# Patient Record
Sex: MALE | Race: OTHER | HISPANIC OR LATINO | Employment: UNEMPLOYED | URBAN - METROPOLITAN AREA
[De-identification: names, ages, dates, MRNs, and addresses within clinical notes are randomized per-mention and may not be internally consistent; named-entity substitution may affect disease eponyms.]

---
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
## 2019-11-08 ENCOUNTER — DOCUMENTATION (OUTPATIENT)
Dept: AUDIOLOGY | Age: 19
End: 2019-11-08

## 2019-11-08 NOTE — PROGRESS NOTES
Progress Note    Name:  Rico Draper  :  2000  Age:  20 y o  Date of Evaluation: 19     Scanned documents         Rashmi Ramirez   Clinical Audiologist

## 2020-02-07 ENCOUNTER — HOSPITAL ENCOUNTER (EMERGENCY)
Facility: HOSPITAL | Age: 20
Discharge: HOME | End: 2020-02-07
Attending: EMERGENCY MEDICINE | Admitting: EMERGENCY MEDICINE
Payer: COMMERCIAL

## 2020-02-07 VITALS
SYSTOLIC BLOOD PRESSURE: 111 MMHG | OXYGEN SATURATION: 98 % | DIASTOLIC BLOOD PRESSURE: 58 MMHG | HEART RATE: 67 BPM | RESPIRATION RATE: 18 BRPM | TEMPERATURE: 98.5 F | WEIGHT: 124.34 LBS | BODY MASS INDEX: 19.47 KG/M2

## 2020-02-07 DIAGNOSIS — Z20.2 STD EXPOSURE: Primary | ICD-10-CM

## 2020-02-07 PROCEDURE — 99283 EMERGENCY DEPT VISIT LOW MDM: CPT

## 2020-02-07 PROCEDURE — 87491 CHLMYD TRACH DNA AMP PROBE: CPT | Performed by: PHYSICIAN ASSISTANT

## 2020-02-07 PROCEDURE — 96372 THER/PROPH/DIAG INJ SC/IM: CPT

## 2020-02-07 PROCEDURE — 99283 EMERGENCY DEPT VISIT LOW MDM: CPT | Performed by: PHYSICIAN ASSISTANT

## 2020-02-07 PROCEDURE — 87591 N.GONORRHOEAE DNA AMP PROB: CPT | Performed by: PHYSICIAN ASSISTANT

## 2020-02-07 RX ORDER — AZITHROMYCIN 250 MG/1
1000 TABLET, FILM COATED ORAL ONCE
Status: COMPLETED | OUTPATIENT
Start: 2020-02-07 | End: 2020-02-07

## 2020-02-07 RX ADMIN — AZITHROMYCIN 1000 MG: 250 TABLET, FILM COATED ORAL at 11:08

## 2020-02-07 RX ADMIN — LIDOCAINE HYDROCHLORIDE 250 MG: 10 INJECTION, SOLUTION EPIDURAL; INFILTRATION; INTRACAUDAL; PERINEURAL at 11:09

## 2020-02-07 NOTE — ED PROVIDER NOTES
History  Chief Complaint   Patient presents with    Exposure to STD     states recent sex with someone dx with pradeepa denies symptoms at this time     68-year-old male presents emerged part with concern for STD exposure  He states that he was recently notified by previous partner that they tested positive for an STD  He denies any symptoms at this time and states he feels well  No known allergies          Prior to Admission Medications   Prescriptions Last Dose Informant Patient Reported? Taking?   dicyclomine (BENTYL) 20 mg tablet   No No   Sig: Take 1 tablet (20 mg total) by mouth 2 (two) times a day   metoclopramide (REGLAN) 10 mg tablet   No No   Sig: Take 1 tablet (10 mg total) by mouth every 6 (six) hours      Facility-Administered Medications: None       Past Medical History:   Diagnosis Date    Depression     Heart murmur     Sensorineural deafness     bilateral    Suicide attempt ([facility] [address] )        History reviewed  No pertinent surgical history  Family History   Problem Relation Age of Onset    Asthma Mother     Heart attack Mother      I have reviewed and agree with the history as documented  Social History     Tobacco Use    Smoking status: Never Smoker    Smokeless tobacco: Never Used   Substance Use Topics    Alcohol use: No    Drug use: No        Review of Systems   All other systems reviewed and are negative  Physical Exam  Physical Exam   Constitutional: He is oriented to person, place, and time  He appears well-developed and well-nourished  He is cooperative  He does not appear ill  No distress  HENT:   Head: Normocephalic and atraumatic  Right Ear: External ear normal    Left Ear: External ear normal    Nose: Nose normal    Mouth/Throat: Oropharynx is clear and moist    Eyes: Pupils are equal, round, and reactive to light  EOM are normal    Neck: Normal range of motion  Neck supple     Cardiovascular: Normal rate, regular rhythm, normal heart sounds and intact distal pulses  Exam reveals no gallop and no friction rub  No murmur heard  Pulmonary/Chest: Effort normal and breath sounds normal  No stridor  No respiratory distress  He has no wheezes  He has no rales  Abdominal: Soft  Bowel sounds are normal  He exhibits no distension  There is no tenderness  There is no guarding  Musculoskeletal: Normal range of motion  He exhibits no tenderness  Neurological: He is alert and oriented to person, place, and time  Skin: Skin is warm  Capillary refill takes less than 2 seconds  Nursing note and vitals reviewed  Vital Signs  ED Triage Vitals [02/07/20 1032]   Temperature Pulse Respirations Blood Pressure SpO2   98 5 °F (36 9 °C) 67 18 111/58 98 %      Temp Source Heart Rate Source Patient Position - Orthostatic VS BP Location FiO2 (%)   Oral Monitor Sitting Right arm --      Pain Score       No Pain           Vitals:    02/07/20 1032   BP: 111/58   Pulse: 67   Patient Position - Orthostatic VS: Sitting         Visual Acuity      ED Medications  Medications   azithromycin (ZITHROMAX) tablet 1,000 mg (has no administration in time range)   cefTRIAXone (ROCEPHIN) 250 mg in lidocaine (PF) (XYLOCAINE-MPF) 1 % IM only syringe (has no administration in time range)       Diagnostic Studies  Results Reviewed     Procedure Component Value Units Date/Time    Chlamydia/GC amplified DNA by PCR [[id number]]     Lab Status:  No result                  No orders to display              Procedures  Procedures         ED Course                               MDM  Number of Diagnoses or Management Options  STD exposure: new and requires workup  Diagnosis management comments: Patient treated in the emergency department for gonorrhea chlamydia  Benign physical exam   Advised we will notify positive test results  Advised to remain abstinent of sexual intercourse for 2 weeks  Patient educated regarding their diagnosis and given return and follow-up instructions    Patient is understanding and in agreement with the treatment plan  There are no questions at the time of discharge  Amount and/or Complexity of Data Reviewed  Clinical lab tests: ordered    Risk of Complications, Morbidity, and/or Mortality  Presenting problems: low  Diagnostic procedures: low  Management options: low    Patient Progress  Patient progress: stable        Disposition  Final diagnoses:   STD exposure     Time reflects when diagnosis was documented in both MDM as applicable and the Disposition within this note     Time User Action Codes Description Comment    2/7/2020 11:06 AM Shelby Hayden Add [Z20 2] STD exposure       ED Disposition     ED Disposition Condition Date/Time Comment    Discharge Stable Fri Feb 7, 2020 11:06 AM Jaja Mishra discharge to home/self care  Follow-up Information     Follow up With Specialties Details Why [facility]   [address] [facility]  [phone]            Patient's Medications   Discharge Prescriptions    No medications on file     No discharge procedures on file      ED Provider  Electronically Signed by           Edwin López PA-C  02/07/20 5915

## 2020-02-08 LAB
C TRACH DNA SPEC QL NAA+PROBE: NEGATIVE
N GONORRHOEA DNA SPEC QL NAA+PROBE: NEGATIVE

## 2020-04-06 ENCOUNTER — HOSPITAL ENCOUNTER (EMERGENCY)
Facility: HOSPITAL | Age: 20
Discharge: HOME | End: 2020-04-06
Attending: EMERGENCY MEDICINE | Admitting: EMERGENCY MEDICINE
Payer: COMMERCIAL

## 2020-04-06 ENCOUNTER — APPOINTMENT (EMERGENCY)
Dept: RADIOLOGY | Facility: HOSPITAL | Age: 20
End: 2020-04-06
Payer: COMMERCIAL

## 2020-04-06 VITALS
WEIGHT: 119.71 LBS | TEMPERATURE: 98.2 F | OXYGEN SATURATION: 98 % | HEART RATE: 95 BPM | BODY MASS INDEX: 18.75 KG/M2 | DIASTOLIC BLOOD PRESSURE: 70 MMHG | RESPIRATION RATE: 16 BRPM | SYSTOLIC BLOOD PRESSURE: 122 MMHG

## 2020-04-06 DIAGNOSIS — S93.401A RIGHT ANKLE SPRAIN: Primary | ICD-10-CM

## 2020-04-06 PROCEDURE — 73630 X-RAY EXAM OF FOOT: CPT

## 2020-04-06 PROCEDURE — 73610 X-RAY EXAM OF ANKLE: CPT

## 2020-04-06 PROCEDURE — 99284 EMERGENCY DEPT VISIT MOD MDM: CPT | Performed by: PHYSICIAN ASSISTANT

## 2020-04-06 PROCEDURE — 99283 EMERGENCY DEPT VISIT LOW MDM: CPT

## 2023-12-17 ENCOUNTER — HOSPITAL ENCOUNTER (EMERGENCY)
Facility: HOSPITAL | Age: 23
Discharge: HOME | End: 2023-12-17
Attending: EMERGENCY MEDICINE
Payer: COMMERCIAL

## 2023-12-17 ENCOUNTER — APPOINTMENT (EMERGENCY)
Dept: CT IMAGING | Facility: HOSPITAL | Age: 23
End: 2023-12-17
Payer: COMMERCIAL

## 2023-12-17 VITALS
SYSTOLIC BLOOD PRESSURE: 122 MMHG | TEMPERATURE: 98.4 F | HEART RATE: 66 BPM | RESPIRATION RATE: 18 BRPM | WEIGHT: 153.44 LBS | DIASTOLIC BLOOD PRESSURE: 70 MMHG | BODY MASS INDEX: 24.03 KG/M2 | OXYGEN SATURATION: 99 %

## 2023-12-17 DIAGNOSIS — R22.0 LEFT FACIAL SWELLING: Primary | ICD-10-CM

## 2023-12-17 DIAGNOSIS — K04.7 DENTAL ABSCESS: ICD-10-CM

## 2023-12-17 DIAGNOSIS — L03.211 FACIAL CELLULITIS: ICD-10-CM

## 2023-12-17 LAB
ALBUMIN SERPL BCP-MCNC: 4.3 G/DL (ref 3.5–5)
ALP SERPL-CCNC: 95 U/L (ref 34–104)
ALT SERPL W P-5'-P-CCNC: 26 U/L (ref 7–52)
ANION GAP SERPL CALCULATED.3IONS-SCNC: 3 MMOL/L
APTT PPP: 28 SECONDS (ref 23–37)
AST SERPL W P-5'-P-CCNC: 32 U/L (ref 13–39)
BASOPHILS # BLD AUTO: 0.03 THOUSANDS/ÂΜL (ref 0–0.1)
BASOPHILS NFR BLD AUTO: 0 % (ref 0–1)
BILIRUB SERPL-MCNC: 0.35 MG/DL (ref 0.2–1)
BUN SERPL-MCNC: 10 MG/DL (ref 5–25)
CALCIUM SERPL-MCNC: 9.3 MG/DL (ref 8.4–10.2)
CHLORIDE SERPL-SCNC: 105 MMOL/L (ref 96–108)
CO2 SERPL-SCNC: 30 MMOL/L (ref 21–32)
CREAT SERPL-MCNC: 1.02 MG/DL (ref 0.6–1.3)
EOSINOPHIL # BLD AUTO: 0.09 THOUSAND/ÂΜL (ref 0–0.61)
EOSINOPHIL NFR BLD AUTO: 1 % (ref 0–6)
ERYTHROCYTE [DISTWIDTH] IN BLOOD BY AUTOMATED COUNT: 14.4 % (ref 11.6–15.1)
GFR SERPL CREATININE-BSD FRML MDRD: 102 ML/MIN/1.73SQ M
GLUCOSE SERPL-MCNC: 96 MG/DL (ref 65–140)
HCT VFR BLD AUTO: 48 % (ref 36.5–49.3)
HGB BLD-MCNC: 15.6 G/DL (ref 12–17)
IMM GRANULOCYTES # BLD AUTO: 0.05 THOUSAND/UL (ref 0–0.2)
IMM GRANULOCYTES NFR BLD AUTO: 0 % (ref 0–2)
INR PPP: 0.95 (ref 0.84–1.19)
LYMPHOCYTES # BLD AUTO: 1.44 THOUSANDS/ÂΜL (ref 0.6–4.47)
LYMPHOCYTES NFR BLD AUTO: 11 % (ref 14–44)
MAGNESIUM SERPL-MCNC: 1.9 MG/DL (ref 1.9–2.7)
MCH RBC QN AUTO: 27.7 PG (ref 26.8–34.3)
MCHC RBC AUTO-ENTMCNC: 32.5 G/DL (ref 31.4–37.4)
MCV RBC AUTO: 85 FL (ref 82–98)
MONOCYTES # BLD AUTO: 1.04 THOUSAND/ÂΜL (ref 0.17–1.22)
MONOCYTES NFR BLD AUTO: 8 % (ref 4–12)
NEUTROPHILS # BLD AUTO: 10.19 THOUSANDS/ÂΜL (ref 1.85–7.62)
NEUTS SEG NFR BLD AUTO: 80 % (ref 43–75)
NRBC BLD AUTO-RTO: 0 /100 WBCS
PLATELET # BLD AUTO: 265 THOUSANDS/UL (ref 149–390)
PMV BLD AUTO: 9.8 FL (ref 8.9–12.7)
POTASSIUM SERPL-SCNC: 3.9 MMOL/L (ref 3.5–5.3)
PROT SERPL-MCNC: 6.8 G/DL (ref 6.4–8.4)
PROTHROMBIN TIME: 12.9 SECONDS (ref 11.6–14.5)
RBC # BLD AUTO: 5.64 MILLION/UL (ref 3.88–5.62)
SODIUM SERPL-SCNC: 138 MMOL/L (ref 135–147)
WBC # BLD AUTO: 12.84 THOUSAND/UL (ref 4.31–10.16)

## 2023-12-17 PROCEDURE — 85025 COMPLETE CBC W/AUTO DIFF WBC: CPT | Performed by: EMERGENCY MEDICINE

## 2023-12-17 PROCEDURE — 96375 TX/PRO/DX INJ NEW DRUG ADDON: CPT

## 2023-12-17 PROCEDURE — 85610 PROTHROMBIN TIME: CPT | Performed by: EMERGENCY MEDICINE

## 2023-12-17 PROCEDURE — 96361 HYDRATE IV INFUSION ADD-ON: CPT

## 2023-12-17 PROCEDURE — G1004 CDSM NDSC: HCPCS

## 2023-12-17 PROCEDURE — 99284 EMERGENCY DEPT VISIT MOD MDM: CPT

## 2023-12-17 PROCEDURE — 83735 ASSAY OF MAGNESIUM: CPT | Performed by: EMERGENCY MEDICINE

## 2023-12-17 PROCEDURE — 80053 COMPREHEN METABOLIC PANEL: CPT | Performed by: EMERGENCY MEDICINE

## 2023-12-17 PROCEDURE — 85730 THROMBOPLASTIN TIME PARTIAL: CPT | Performed by: EMERGENCY MEDICINE

## 2023-12-17 PROCEDURE — 96365 THER/PROPH/DIAG IV INF INIT: CPT

## 2023-12-17 PROCEDURE — 99285 EMERGENCY DEPT VISIT HI MDM: CPT | Performed by: EMERGENCY MEDICINE

## 2023-12-17 PROCEDURE — 70487 CT MAXILLOFACIAL W/DYE: CPT

## 2023-12-17 PROCEDURE — 36415 COLL VENOUS BLD VENIPUNCTURE: CPT | Performed by: EMERGENCY MEDICINE

## 2023-12-17 RX ORDER — CEPHALEXIN 500 MG/1
500 CAPSULE ORAL EVERY 6 HOURS SCHEDULED
Qty: 28 CAPSULE | Refills: 0 | Status: SHIPPED | OUTPATIENT
Start: 2023-12-17 | End: 2023-12-24

## 2023-12-17 RX ORDER — CEFAZOLIN SODIUM 2 G/50ML
2000 SOLUTION INTRAVENOUS ONCE
Status: COMPLETED | OUTPATIENT
Start: 2023-12-17 | End: 2023-12-17

## 2023-12-17 RX ORDER — CHLORHEXIDINE GLUCONATE ORAL RINSE 1.2 MG/ML
15 SOLUTION DENTAL 2 TIMES DAILY
Qty: 120 ML | Refills: 0 | Status: SHIPPED | OUTPATIENT
Start: 2023-12-17

## 2023-12-17 RX ORDER — TRAMADOL HYDROCHLORIDE 50 MG/1
25 TABLET ORAL EVERY 8 HOURS PRN
Qty: 3 TABLET | Refills: 0 | Status: SHIPPED | OUTPATIENT
Start: 2023-12-17 | End: 2023-12-19

## 2023-12-17 RX ORDER — KETOROLAC TROMETHAMINE 30 MG/ML
30 INJECTION, SOLUTION INTRAMUSCULAR; INTRAVENOUS ONCE
Status: COMPLETED | OUTPATIENT
Start: 2023-12-17 | End: 2023-12-17

## 2023-12-17 RX ORDER — DEXAMETHASONE SODIUM PHOSPHATE 10 MG/ML
10 INJECTION, SOLUTION INTRAMUSCULAR; INTRAVENOUS ONCE
Status: COMPLETED | OUTPATIENT
Start: 2023-12-17 | End: 2023-12-17

## 2023-12-17 RX ORDER — PREDNISONE 20 MG/1
40 TABLET ORAL DAILY
Qty: 6 TABLET | Refills: 0 | Status: SHIPPED | OUTPATIENT
Start: 2023-12-17 | End: 2023-12-20

## 2023-12-17 RX ADMIN — CEFAZOLIN SODIUM 2000 MG: 2 SOLUTION INTRAVENOUS at 15:14

## 2023-12-17 RX ADMIN — DEXAMETHASONE SODIUM PHOSPHATE 10 MG: 10 INJECTION INTRAMUSCULAR; INTRAVENOUS at 15:14

## 2023-12-17 RX ADMIN — KETOROLAC TROMETHAMINE 30 MG: 30 INJECTION, SOLUTION INTRAMUSCULAR; INTRAVENOUS at 15:14

## 2023-12-17 RX ADMIN — IOHEXOL 85 ML: 350 INJECTION, SOLUTION INTRAVENOUS at 16:45

## 2023-12-17 RX ADMIN — SODIUM CHLORIDE 1000 ML: 0.9 INJECTION, SOLUTION INTRAVENOUS at 15:14

## 2023-12-17 NOTE — Clinical Note
Deion Wilson was seen and treated in our emergency department on 12/17/2023.                Diagnosis:     Deion  may return to work on return date.    He may return on this date: 12/19/2023         If you have any questions or concerns, please don't hesitate to call.      Doreen Galeas, DO    ______________________________           _______________          _______________  Hospital Representative                              Date                                Time

## 2023-12-17 NOTE — DISCHARGE INSTRUCTIONS
Please alternate Tylenol and Motrin every 6-8 hours for fever and pain control  Please complete full course of antibiotics  PLEASE CALL YOUR DENTIST TOMORROW FOR CLOSE FOLLOW UP. IF YOU CANNOT CONTACT THEM, PLEASE CALL THE DENTAL CLINIC LISTED ABOVE

## 2023-12-17 NOTE — ED PROVIDER NOTES
History  Chief Complaint   Patient presents with    Facial Swelling     Pt c/o left sided facial swelling due to poor dental pratices. Pt states he has some infected wisdom teeth. Pt states the swelling stated x 3 days ago but has worsened within the past x 1 day. Pt denies any breathing/swallowing issues.      Patient is a 23-year-old male otherwise healthy coming in today for facial swelling on the left side.  He states initially 3 days ago it started with minimal swelling but progressively worsened.  He has no fevers, chills, nausea, vomiting or fevers.  No ear pain, sore throat, or trauma.  He states that he was taken ibuprofen without relief.  He took a dose of his dad's girlfriend antibiotic but unsure what kind.  He states that the swelling is causing just more pressure. Patient denies difficulty eating, drinking, or swallowing. No shortness or breath.       History provided by:  Patient and medical records   used: No    Dental Pain  Location:  Upper  Quality:  Aching, constant and dull  Severity:  Moderate  Onset quality:  Gradual  Duration:  3 days  Timing:  Constant  Progression:  Worsening  Chronicity:  New  Relieved by:  Nothing  Worsened by:  Nothing  Ineffective treatments:  Acetaminophen and NSAIDs  Associated symptoms: facial pain and facial swelling    Associated symptoms: no congestion, no difficulty swallowing, no drooling, no fever, no gum swelling, no headaches, no neck pain, no neck swelling, no oral bleeding, no oral lesions and no trismus    Risk factors: no alcohol problem, no cancer, no chewing tobacco use, no diabetes, no immunosuppression, sufficient dental care, no periodontal disease and no smoking        Prior to Admission Medications   Prescriptions Last Dose Informant Patient Reported? Taking?   dicyclomine (BENTYL) 20 mg tablet Not Taking  No No   Sig: Take 1 tablet (20 mg total) by mouth 2 (two) times a day   Patient not taking: Reported on 12/17/2023    metoclopramide (REGLAN) 10 mg tablet Not Taking  No No   Sig: Take 1 tablet (10 mg total) by mouth every 6 (six) hours   Patient not taking: Reported on 12/17/2023      Facility-Administered Medications: None       Past Medical History:   Diagnosis Date    Depression     Heart murmur     Sensorineural deafness     bilateral    Suicide attempt (HCC)        History reviewed. No pertinent surgical history.    Family History   Problem Relation Age of Onset    Asthma Mother     Heart attack Mother      I have reviewed and agree with the history as documented.    E-Cigarette/Vaping    E-Cigarette Use Never User      E-Cigarette/Vaping Substances     Social History     Tobacco Use    Smoking status: Some Days     Types: Cigarettes    Smokeless tobacco: Never   Vaping Use    Vaping status: Never Used   Substance Use Topics    Alcohol use: No    Drug use: No       Review of Systems   Constitutional: Negative.  Negative for chills and fever.   HENT:  Positive for facial swelling. Negative for congestion, drooling, ear pain, mouth sores and sore throat.    Eyes: Negative.  Negative for pain and visual disturbance.   Respiratory: Negative.  Negative for cough and shortness of breath.    Cardiovascular: Negative.  Negative for chest pain and palpitations.   Gastrointestinal: Negative.  Negative for abdominal pain and vomiting.   Genitourinary: Negative.  Negative for dysuria and hematuria.   Musculoskeletal: Negative.  Negative for arthralgias, back pain and neck pain.   Skin: Negative.  Negative for color change and rash.   Neurological: Negative.  Negative for seizures, syncope and headaches.   Hematological: Negative.    Psychiatric/Behavioral: Negative.     All other systems reviewed and are negative.      Physical Exam  Physical Exam  Vitals and nursing note reviewed.   Constitutional:       General: He is not in acute distress.     Appearance: He is well-developed.   HENT:      Head: Normocephalic and atraumatic.         Right Ear: Tympanic membrane, ear canal and external ear normal.      Left Ear: Tympanic membrane, ear canal and external ear normal.      Nose: Nose normal.      Mouth/Throat:      Lips: Pink.      Mouth: Mucous membranes are moist.      Pharynx: Uvula midline.      Comments: Patient maintaining airway and secretions. No stridor . No brawniness under tongue.  Posterior pharynx clear without any exudate.  Uvula midline without edema    Noted along the buccal left upper molar region, moderate swelling with no obvious abscess.  There is no obvious periodontal abscess.  Eyes:      Conjunctiva/sclera: Conjunctivae normal.   Cardiovascular:      Rate and Rhythm: Normal rate and regular rhythm.      Pulses:           Radial pulses are 2+ on the right side and 2+ on the left side.      Heart sounds: Normal heart sounds, S1 normal and S2 normal. No murmur heard.  Pulmonary:      Effort: Pulmonary effort is normal. No respiratory distress.      Breath sounds: Normal breath sounds.   Abdominal:      Palpations: Abdomen is soft.      Tenderness: There is no abdominal tenderness.   Musculoskeletal:         General: No swelling.      Cervical back: Neck supple.      Right lower leg: No edema.      Left lower leg: No edema.   Skin:     General: Skin is warm and dry.      Capillary Refill: Capillary refill takes less than 2 seconds.   Neurological:      General: No focal deficit present.      Mental Status: He is alert and oriented to person, place, and time.      GCS: GCS eye subscore is 4. GCS verbal subscore is 5. GCS motor subscore is 6.      Cranial Nerves: Cranial nerves 2-12 are intact.      Sensory: Sensation is intact.      Motor: Motor function is intact.      Coordination: Coordination is intact.      Comments: No slurred speech.  No facial asymmetry.  No tongue deviation.   Psychiatric:         Mood and Affect: Mood normal.         Behavior: Behavior normal.         Thought Content: Thought content normal.          Judgment: Judgment normal.         Vital Signs  ED Triage Vitals [12/17/23 1437]   Temperature Pulse Respirations Blood Pressure SpO2   98.4 °F (36.9 °C) 85 18 153/73 98 %      Temp Source Heart Rate Source Patient Position - Orthostatic VS BP Location FiO2 (%)   Oral Monitor Sitting Right arm --      Pain Score       6           Vitals:    12/17/23 1437 12/17/23 1746   BP: 153/73 122/70   Pulse: 85 66   Patient Position - Orthostatic VS: Sitting Lying         Visual Acuity      ED Medications  Medications   sodium chloride 0.9 % bolus 1,000 mL (0 mL Intravenous Stopped 12/17/23 1658)   dexamethasone (PF) (DECADRON) injection 10 mg (10 mg Intravenous Given 12/17/23 1514)   ketorolac (TORADOL) injection 30 mg (30 mg Intravenous Given 12/17/23 1514)   ceFAZolin (ANCEF) IVPB (premix in dextrose) 2,000 mg 50 mL (0 mg Intravenous Stopped 12/17/23 1553)   iohexol (OMNIPAQUE) 350 MG/ML injection (MULTI-DOSE) 85 mL (85 mL Intravenous Given 12/17/23 1645)       Diagnostic Studies  Results Reviewed       Procedure Component Value Units Date/Time    Comprehensive metabolic panel [[id number]] Collected: 12/17/23 1513    Lab Status: Final result Specimen: Blood from Arm, Right Updated: 12/17/23 1543     Sodium 138 mmol/L      Potassium 3.9 mmol/L      Chloride 105 mmol/L      CO2 30 mmol/L      ANION GAP 3 mmol/L      BUN 10 mg/dL      Creatinine 1.02 mg/dL      Glucose 96 mg/dL      Calcium 9.3 mg/dL      AST 32 U/L      ALT 26 U/L      Alkaline Phosphatase 95 U/L      Total Protein 6.8 g/dL      Albumin 4.3 g/dL      Total Bilirubin 0.35 mg/dL      eGFR 102 ml/min/1.73sq m     Narrative:      National Kid[employer] Disease Foundation guidelines for Chronic Kidney Disease (CKD):     Stage 1 with normal or high GFR (GFR > 90 mL/min/1.73 square meters)    Stage 2 Mild CKD (GFR = 60-89 mL/min/1.73 square meters)    Stage 3A Moderate CKD (GFR = 45-59 mL/min/1.73 square meters)    Stage 3B Moderate CKD (GFR = 30-44 mL/min/1.73 square  meters)    Stage 4 Severe CKD (GFR = 15-29 mL/min/1.73 square meters)    Stage 5 End Stage CKD (GFR <15 mL/min/1.73 square meters)  Note: GFR calculation is accurate only with a steady state creatinine    Magnesium [[id number]]  (Normal) Collected: 12/17/23 1513    Lab Status: Final result Specimen: Blood from Arm, Right Updated: 12/17/23 1543     Magnesium 1.9 mg/dL     Protime-INR [[id number]]  (Normal) Collected: 12/17/23 1513    Lab Status: Final result Specimen: Blood from Arm, Right Updated: 12/17/23 1534     Protime 12.9 seconds      INR 0.95    APTT [[id number]]  (Normal) Collected: 12/17/23 1513    Lab Status: Final result Specimen: Blood from Arm, Right Updated: 12/17/23 1534     PTT 28 seconds     CBC and differential [[id number]]  (Abnormal) Collected: 12/17/23 1513    Lab Status: Final result Specimen: Blood from Arm, Right Updated: 12/17/23 1524     WBC 12.84 Thousand/uL      RBC 5.64 Million/uL      Hemoglobin 15.6 g/dL      Hematocrit 48.0 %      MCV 85 fL      MCH 27.7 pg      MCHC 32.5 g/dL      RDW 14.4 %      MPV 9.8 fL      Platelets 265 Thousands/uL      nRBC 0 /100 WBCs      Neutrophils Relative 80 %      Immat GRANS % 0 %      Lymphocytes Relative 11 %      Monocytes Relative 8 %      Eosinophils Relative 1 %      Basophils Relative 0 %      Neutrophils Absolute 10.19 Thousands/µL      Immature Grans Absolute 0.05 Thousand/uL      Lymphocytes Absolute 1.44 Thousands/µL      Monocytes Absolute 1.04 Thousand/µL      Eosinophils Absolute 0.09 Thousand/µL      Basophils Absolute 0.03 Thousands/µL                    CT facial bones with contrast   Final Result by Francesco Mccarty MD (12/17 1727)      Left second maxillary molar periapical abscess with a regional collection adjacent to the left maxilla and associated cellulitis. Polypoid mucosal thickening of the left maxillary sinus is likely odontogenic in origin.         Workstation performed: [id number]                    Procedures  Procedures         ED  "Course  ED Course as of 12/17/23 1811   Sun Dec 17, 2023   1503 Patient is a 24-year-old male coming in today with nontraumatic left-sided facial swelling.  On exam he does have warmness to touch along the left side of cheek with no obvious injury or    Disclosure: Voice to text software was used in the preparation of this document and could have resulted in translational errors.      Occasional wrong word or \"sound a like\" substitutions may have occurred due to the inherent limitations of voice recognition software.  Read the chart carefully and recognize, using context, where substitutions have occurred.       I have independently reviewed external records are available to me to the level of detail possible within the time constraints of my patient care responsibilities in the ED.       1545 Labs reviewed and without actionable derangement     1550 Patient resting in bed.  Patient states that he does feel the swelling is decreased and an exam there is mild improvement in swelling.  He is maintaining airway and secretions and no respiratory distress.  Speaking full sentences.  Updated on labs and pending CT   1738 Patient resting in bed.  Marked improvement of left-sided facial swelling.  I again do not appreciate any periapical abscess on my exam.  He is maintaining airway and secretions.  There is no brawniness under the tongue and no tongue deviation.  No slurred speech.    Long discussion with patient regarding CT findings.  Will DC home with antibiotics, short course of steroids as well as pain control and anti-inflammatories.  He states he does have a dentist.  I instructed him is important and imperative to follow-up with a dentist in the next few days.  Return to ER instructions given    Counseling: I had a detailed discussion with the patient and/or guardian regarding: the historical points, exam findings, and any diagnostic results supporting the discharge diagnosis, lab results, radiology results, " discharge instructions reviewed with patient and/or family/caregiver and understanding was verbalized. Instructions given to return to the emergency department if symptoms worsen or persist, or if there are any questions or concerns that arise at home.     All imaging and/or lab testing discussed with patient, strict return to ED precautions discussed. Patient recommended to follow up promptly with appropriate outpatient provider. Patient and/or family members verbalizes understanding and agrees with plan. Patient and/or family members were given opportunity to ask questions, all questions were answered at this time. Patient is stable for discharge                                   SBIRT 20yo+      Flowsheet Row Most Recent Value   Initial Alcohol Screen: US AUDIT-C     1. How often do you have a drink containing alcohol? 0 Filed at: 12/17/2023 1524   2. How many drinks containing alcohol do you have on a typical day you are drinking?  0 Filed at: 12/17/2023 1524   3a. Male UNDER 65: How often do you have five or more drinks on one occasion? 0 Filed at: 12/17/2023 1524   Audit-C Score 0 Filed at: 12/17/2023 1524   XANDER: How many times in the past year have you...    Used an illegal drug or used a prescription medication for non-medical reasons? Never Filed at: 12/17/2023 1524                      Medical Decision Making  differential diagnosis includes but not limited to: Dental caries, pulpitis, fracture, periapical abscess, periodontitis, abscess, tooth grinding, neuritis, sinus infection, neuropathic pain, gingivitis, ludwigs angina      Amount and/or Complexity of Data Reviewed  External Data Reviewed: notes.  Labs: ordered. Decision-making details documented in ED Course.     Details: Mild leukocytosis without bands.  No acute kidney injury or electrolyte dysfunction  No anemia or thrombocytopenia  Radiology: ordered. Decision-making details documented in ED Course.     Details: CT interpreted by radiologist  as    Risk  Prescription drug management.             Disposition  Final diagnoses:   Left facial swelling   Facial cellulitis   Dental abscess     Time reflects when diagnosis was documented in both MDM as applicable and the Disposition within this note       Time User Action Codes Description Comment    12/17/2023  3:14 PM Doreen Galeas Add [R22.0] Left facial swelling     12/17/2023  5:40 PM Doreen Galeas Add [L03.211] Facial cellulitis     12/17/2023  5:40 PM Doreen Galeas Add [K04.7] Dental abscess           ED Disposition       ED Disposition   Discharge    Condition   Stable    Date/Time   Sun Dec 17, 2023 1749    Comment   Deion Wilson discharge to home/self care.                   Follow-up Information       Follow up With Specialties Details Why Contact Info Additional Information    [facility] Schedule an appointment as soon as possible for a visit in 1 week  [address] [place] [id number] [phone] [facility], [address] [place] [address], [address] [phone]    [facility]  Call in 2 days  [address] [facility] [address] [phone]             Discharge Medication List as of 12/17/2023  5:49 PM        START taking these medications    Details   cephalexin (KEFLEX) 500 mg capsule Take 1 capsule (500 mg total) by mouth every 6 (six) hours for 7 days, Starting Sun 12/17/2023, Until Sun 12/24/2023, Normal      chlorhexidine (PERIDEX) 0.12 % solution Apply 15 mL to the mouth or throat 2 (two) times a day, Starting Sun 12/17/2023, Normal      predniSONE 20 mg tablet Take 2 tablets (40 mg total) by mouth daily for 3 days, Starting Sun 12/17/2023, Until Wed 12/20/2023, Normal      traMADol (Ultram) 50 mg tablet Take 0.5 tablets (25 mg total) by mouth every 8 (eight) hours as needed for moderate pain for up to 2  days, Starting Sun 12/17/2023, Until Tue 12/19/2023 at 2359, Normal           CONTINUE these medications which have NOT CHANGED    Details   dicyclomine (BENTYL) 20 mg tablet Take 1 tablet (20 mg total) by mouth 2 (two) times a day, Starting Sat 9/8/2018, Print      metoclopramide (REGLAN) 10 mg tablet Take 1 tablet (10 mg total) by mouth every 6 (six) hours, Starting Sat 9/8/2018, Print             No discharge procedures on file.    PDMP Review       None            ED Provider  Electronically Signed by             Doreen Galeas DO  12/17/23 5852

## 2024-01-06 ENCOUNTER — HOSPITAL ENCOUNTER (EMERGENCY)
Facility: HOSPITAL | Age: 24
Discharge: HOME | End: 2024-01-06
Attending: EMERGENCY MEDICINE
Payer: COMMERCIAL

## 2024-01-06 ENCOUNTER — APPOINTMENT (EMERGENCY)
Dept: RADIOLOGY | Facility: HOSPITAL | Age: 24
End: 2024-01-06
Payer: COMMERCIAL

## 2024-01-06 VITALS
HEART RATE: 107 BPM | SYSTOLIC BLOOD PRESSURE: 136 MMHG | OXYGEN SATURATION: 97 % | RESPIRATION RATE: 16 BRPM | DIASTOLIC BLOOD PRESSURE: 99 MMHG

## 2024-01-06 DIAGNOSIS — S61.219A FINGER LACERATION: Primary | ICD-10-CM

## 2024-01-06 PROCEDURE — 99284 EMERGENCY DEPT VISIT MOD MDM: CPT

## 2024-01-06 PROCEDURE — 12002 RPR S/N/AX/GEN/TRNK2.6-7.5CM: CPT

## 2024-01-06 PROCEDURE — 99283 EMERGENCY DEPT VISIT LOW MDM: CPT

## 2024-01-06 RX ORDER — CEPHALEXIN 500 MG/1
500 CAPSULE ORAL EVERY 8 HOURS SCHEDULED
Qty: 15 CAPSULE | Refills: 0 | Status: SHIPPED | OUTPATIENT
Start: 2024-01-06 | End: 2024-01-11

## 2024-01-06 RX ORDER — CEPHALEXIN 250 MG/1
500 CAPSULE ORAL ONCE
Status: COMPLETED | OUTPATIENT
Start: 2024-01-06 | End: 2024-01-06

## 2024-01-06 RX ORDER — LIDOCAINE HYDROCHLORIDE 10 MG/ML
10 INJECTION, SOLUTION EPIDURAL; INFILTRATION; INTRACAUDAL; PERINEURAL ONCE
Status: COMPLETED | OUTPATIENT
Start: 2024-01-06 | End: 2024-01-06

## 2024-01-06 RX ADMIN — CEPHALEXIN 500 MG: 250 CAPSULE ORAL at 20:21

## 2024-01-06 RX ADMIN — LIDOCAINE HYDROCHLORIDE 10 ML: 10 INJECTION, SOLUTION EPIDURAL; INFILTRATION; INTRACAUDAL at 20:01

## 2024-01-07 NOTE — ED PROVIDER NOTES
"History  Chief Complaint   Patient presents with    Hand Laceration     Pt reports cut self with knife on L hand. Unknown tetanus vaccine. No thinners. Unsure how deep cut is     23-year-old male presenting to the ED with complaints of laceration over the left hand.  Triage note states that patient cut himself with a knife.  Initially patient said that he did cut himself with a knife.  When trying to question further patient would not elaborate.  Then he states he punched a wall.  This provider asked the patient if he punched a wall with a knife.  Patient proceeded to stay \"I am just lying to, I just want my laceration repaired so I can go home.\"  Patient denies that he cut himself intentionally.  He denies SI and HI.  He denies any other injuries.  Denies that whatever cut his finger was overtly dirty or contaminated.  Uncertain of last tetanus but patient reports that he does not want his tetanus updated today.  He declines any pain medications.  No other complaints today.          Prior to Admission Medications   Prescriptions Last Dose Informant Patient Reported? Taking?   chlorhexidine (PERIDEX) 0.12 % solution   No No   Sig: Apply 15 mL to the mouth or throat 2 (two) times a day   dicyclomine (BENTYL) 20 mg tablet   No No   Sig: Take 1 tablet (20 mg total) by mouth 2 (two) times a day   Patient not taking: Reported on 12/17/2023   metoclopramide (REGLAN) 10 mg tablet   No No   Sig: Take 1 tablet (10 mg total) by mouth every 6 (six) hours   Patient not taking: Reported on 12/17/2023      Facility-Administered Medications: None       Past Medical History:   Diagnosis Date    Depression     Heart murmur     Sensorineural deafness     bilateral    Suicide attempt (HCC)        History reviewed. No pertinent surgical history.    Family History   Problem Relation Age of Onset    Asthma Mother     Heart attack Mother      I have reviewed and agree with the history as documented.    E-Cigarette/Vaping    E-Cigarette " Use Never User      E-Cigarette/Vaping Substances     Social History     Tobacco Use    Smoking status: Some Days     Types: Cigarettes    Smokeless tobacco: Never   Vaping Use    Vaping status: Never Used   Substance Use Topics    Alcohol use: No    Drug use: No       Review of Systems   Skin:  Positive for wound (Left hand).       Physical Exam  Physical Exam  Vitals and nursing note reviewed.   Constitutional:       General: He is not in acute distress.     Appearance: He is well-developed.      Comments: Awake, alert, interactive and resting in the stretcher in no acute distress.  Patient is not ill or toxic appearing.     HENT:      Head: Normocephalic and atraumatic.   Eyes:      Conjunctiva/sclera: Conjunctivae normal.   Cardiovascular:      Rate and Rhythm: Normal rate and regular rhythm.      Heart sounds: No murmur heard.  Pulmonary:      Effort: Pulmonary effort is normal. No respiratory distress.      Breath sounds: Normal breath sounds.   Abdominal:      Palpations: Abdomen is soft.      Tenderness: There is no abdominal tenderness.   Musculoskeletal:         General: No swelling.      Cervical back: Neck supple.      Comments: Laceration over the dorsal aspect of the base of the left middle finger.  There is a flap of skin.  There is a minor venous ooze.  There is no exposed bone, tendon, ligaments or vasculature.  There is no associated bony tenderness.  Patient able to flex and extend all joints of all digits of the left hand.  Distal cap refill over the left middle finger is <2 secs.  Few minor abrasions over the left hand otherwise no other signs of injury or trauma.  PE otherwise unremarkable.  No other signs of injury or trauma on exam.   Skin:     General: Skin is warm and dry.      Capillary Refill: Capillary refill takes less than 2 seconds.   Neurological:      Mental Status: He is alert and oriented to person, place, and time.      GCS: GCS eye subscore is 4. GCS verbal subscore is 5. GCS  motor subscore is 6.   Psychiatric:         Mood and Affect: Mood normal.               Vital Signs  ED Triage Vitals [01/06/24 1853]   Temp Pulse Respirations Blood Pressure SpO2   -- (!) 107 16 136/99 97 %      Temp src Heart Rate Source Patient Position - Orthostatic VS BP Location FiO2 (%)   -- Monitor Sitting Right arm --      Pain Score       --           Vitals:    01/06/24 1853   BP: 136/99   Pulse: (!) 107   Patient Position - Orthostatic VS: Sitting         Visual Acuity      ED Medications  Medications   lidocaine (PF) (XYLOCAINE-MPF) 1 % injection 10 mL (10 mL Infiltration Given 1/6/24 2001)   cephalexin (KEFLEX) capsule 500 mg (500 mg Oral Given 1/6/24 2021)       Diagnostic Studies  Results Reviewed       None                   XR hand 3+ views LEFT    (Results Pending)              Procedures  Universal Protocol:  Consent: Verbal consent obtained.  Risks and benefits: risks, benefits and alternatives were discussed  Consent given by: patient  Patient understanding: patient states understanding of the procedure being performed  Laceration repair    Date/Time: 1/6/2024 8:32 PM    Performed by: Ady Shin PA-C  Authorized by: Ady Shin PA-C  Body area: upper extremity  Location details: left long finger  Laceration length: 4 cm  Foreign bodies: no foreign bodies  Tendon involvement: none  Nerve involvement: none  Vascular damage: no  Anesthesia: local infiltration    Anesthesia:  Local Anesthetic: lidocaine 1% without epinephrine  Anesthetic total: 2 mL    Sedation:  Patient sedated: no        Procedure Details:  Preparation: Patient was prepped and draped in the usual sterile fashion.  Irrigation solution: saline  Irrigation method: syringe  Amount of cleaning: standard  Debridement: none  Skin closure: 4-0 nylon  Number of sutures: 6  Technique: simple  Approximation: close  Approximation difficulty: simple  Patient tolerance: patient tolerated the procedure well with no  "immediate complications  Comments: Hemostasis achieved.  Covered with Xeroform and Band-Aid.               ED Course  ED Course as of 01/06/24 2042   Sat Jan 06, 2024 2035 Laceration repaired without issue.  Patient declined XR to rule out osseous abnormality, lower suspicion for radiopaque retained foreign body as base of wound irrigated with no foreign bodies appreciated.  At time of discharge patient states he feels well to go home.  He feels safe to go home.  He does not need police called.  He continues to deny SI and HI.  Patient A&O x 3.  GCS-15.  Clinically sober with no signs of intoxication.  Will start patient on Keflex in light of uncertainty how patient got the wound for infection prevention.  Advised patient to have the sutures removed in 10-14 days.  Patient wants to be discharged he does not want a wait for his discharge paperwork.  Antibiotic sent to patient's pharmacy.  Strict return precautions verbally communicated to the patient as outlined in the AVS.  All patient questions and concerns were answered.  Patient verbally communicated their understanding and agreement to the above plan.  Patient stable at discharge.       Portions of the record may have been created with voice recognition software. Occasional wrong word or \"sound a like\" substitutions may have occurred due to the inherent limitations of voice recognition software. Read the chart carefully and recognize, using context, where substitutions have occurred.     2041 Per chart review. Tdap- 11/27/17. Pt continues to decline having this updated.                                SBIRT 20yo+      Flowsheet Row Most Recent Value   Initial Alcohol Screen: US AUDIT-C     1. How often do you have a drink containing alcohol? 0 Filed at: 01/06/2024 2009   2. How many drinks containing alcohol do you have on a typical day you are drinking?  0 Filed at: 01/06/2024 2009   3a. Male UNDER 65: How often do you have five or more drinks on one occasion? " 0 Filed at: 01/06/2024 2009   3b. FEMALE Any Age, or MALE 65+: How often do you have 4 or more drinks on one occassion? 0 Filed at: 01/06/2024 2009   Audit-C Score 0 Filed at: 01/06/2024 2009   XANDER: How many times in the past year have you...    Used an illegal drug or used a prescription medication for non-medical reasons? Never Filed at: 01/06/2024 2009                      Medical Decision Making  Amount and/or Complexity of Data Reviewed  Radiology: ordered.    Risk  Prescription drug management.             Disposition  Final diagnoses:   Finger laceration     Time reflects when diagnosis was documented in both MDM as applicable and the Disposition within this note       Time User Action Codes Description Comment    1/6/2024  8:37 PM Ady Shin Add [S61.219A] Finger laceration           ED Disposition       ED Disposition   Discharge    Condition   Stable    Date/Time   Sat Jan 6, 2024 2037    Comment   Deion Wilson discharge to home/self care.                   Follow-up Information    None         Patient's Medications   Discharge Prescriptions    CEPHALEXIN (KEFLEX) 500 MG CAPSULE    Take 1 capsule (500 mg total) by mouth every 8 (eight) hours for 5 days       Start Date: 1/6/2024  End Date: 1/11/2024       Order Dose: 500 mg       Quantity: 15 capsule    Refills: 0       No discharge procedures on file.    PDMP Review       None            ED Provider  Electronically Signed by             Ady Shin PA-C  01/06/24 2041       Ady Shin PA-C  01/06/24 2043